# Patient Record
Sex: FEMALE | Race: OTHER | Employment: FULL TIME | URBAN - METROPOLITAN AREA
[De-identification: names, ages, dates, MRNs, and addresses within clinical notes are randomized per-mention and may not be internally consistent; named-entity substitution may affect disease eponyms.]

---

## 2023-12-08 ENCOUNTER — OFFICE VISIT (OUTPATIENT)
Dept: URGENT CARE | Facility: CLINIC | Age: 33
End: 2023-12-08

## 2023-12-08 VITALS
RESPIRATION RATE: 18 BRPM | SYSTOLIC BLOOD PRESSURE: 110 MMHG | WEIGHT: 162 LBS | OXYGEN SATURATION: 98 % | DIASTOLIC BLOOD PRESSURE: 78 MMHG | HEART RATE: 67 BPM | TEMPERATURE: 99 F

## 2023-12-08 DIAGNOSIS — U07.1 COVID-19: Primary | ICD-10-CM

## 2023-12-08 DIAGNOSIS — J06.9 ACUTE URI: ICD-10-CM

## 2023-12-08 LAB
SARS-COV-2 AG UPPER RESP QL IA: POSITIVE
VALID CONTROL: ABNORMAL

## 2023-12-08 NOTE — PROGRESS NOTES
[facility]        NAME: Adeel Kim is a 29 y.o. female  : 1990    MRN: [id number]  DATE: 2023  TIME: 1:15 PM    Assessment and Plan   Acute URI [J06.9]  1. Acute URI  Poct Covid 19 Rapid Antigen Test            Patient Instructions   COVID-19:   -The patient appears stable today. Rapid COVID Is positive. -Quarantine guidelines discussed as per CDC. Quarantine/isolate five days from the onset of symptoms and another five days with a N95/K95 mask. No fever for at least 24 hours without the use of fever reducing medications. Must have an improvement of your symptoms.   -Stay well hydrated and rested. Monitor hydration status. Push fluids. -Vitamin C, D and zinc daily.   -Neti-pot or nasal rinses for sinus pressure  -Run a humidifier next to your bed and take steam showers  -Tylenol for fever, chills, body aches. -Mucinex or OTC cold medications for symptomatic relief. OTC supportive measures discussed  -Warm salt water gargles and tea with honey for sore throat  -Obtain a pulse ox and monitor your oxygen 2-3 times a day. You want your O2 to be >93%. If your <93% you need to go to the ED immediately.  -Will follow up in 2-3 days. If you experience worsening/red flag symptoms like shortness of breath, worsening shortness of breath, confusion, lethargy, chest pains, dizziness, weakness go to the ED or follow up immediately for evaluation. Follow up with PCP in 3-5 days. Proceed to  ER if symptoms worsen. Chief Complaint     Chief Complaint   Patient presents with    Cold Like Symptoms     Pt here for concerns  of Covid  she needs testing for work. Pt here ill x 5 days  s/s  fever, headache, cough. Pt used Ibuprofen. Pt  did a home Cvoid test was positive. History of Present Illness       The patient is a 27-year-old female who presents today for COVID. The [email or web address] is used for translation today.  The patient tested positive yesterday with a home test but needs confirmation for work. She has been ill three days with fever, headache, coughing. She states that her worst sx today is the headache which is relieved with tylenol and motrin. No chills, body aches. No dyspnea, wheezing, chest tightness, cp, palpitations. No dizziness or weakness. No GI sx. Good PO intake. No loss of taste or smell. No lower extremity edema. No hx of asthma or smoking. No known sick contacts or recent travel. Review of Systems   Review of Systems   Constitutional:  Positive for fever. Negative for activity change, appetite change, chills, diaphoresis and fatigue. HENT:  Positive for congestion. Negative for ear discharge, ear pain, facial swelling, hearing loss, postnasal drip, rhinorrhea, sinus pressure, sinus pain, sore throat, tinnitus, trouble swallowing and voice change. Eyes:  Negative for visual disturbance. Respiratory:  Positive for cough. Negative for apnea, chest tightness, shortness of breath, wheezing and stridor. Cardiovascular:  Negative for chest pain, palpitations and leg swelling. Gastrointestinal:  Negative for abdominal distention and abdominal pain. Genitourinary:  Negative for decreased urine volume. Musculoskeletal:  Negative for arthralgias, joint swelling, myalgias, neck pain and neck stiffness. Skin:  Negative for rash. Allergic/Immunologic: Negative for immunocompromised state. Neurological:  Positive for headaches. Negative for dizziness, weakness, light-headedness and numbness. Hematological:  Negative for adenopathy. Current Medications     No current outpatient medications on file.     Current Allergies     Allergies as of 12/08/2023    (No Known Allergies)            The following portions of the patient's history were reviewed and updated as appropriate: allergies, current medications, past family history, past medical history, past social history, past surgical history and problem list.     Past Medical History: Diagnosis Date    Patient denies medical problems        Past Surgical History:   Procedure Laterality Date    CHOLECYSTECTOMY         History reviewed. No pertinent family history. Medications have been verified. Objective   /78   Pulse 67   Temp 99 °F (37.2 °C)   Resp 18   Wt 73.5 kg (162 lb)   SpO2 98%   No LMP recorded. Physical Exam     Physical Exam  Vitals and nursing note reviewed. Constitutional:       General: She is not in acute distress. Appearance: She is well-developed. She is not ill-appearing, toxic-appearing or diaphoretic. HENT:      Head: Normocephalic and atraumatic. Right Ear: Hearing, tympanic membrane, ear canal and external ear normal.      Left Ear: Hearing, tympanic membrane, ear canal and external ear normal.      Nose: Congestion present. No mucosal edema or rhinorrhea. Right Sinus: No maxillary sinus tenderness or frontal sinus tenderness. Left Sinus: No maxillary sinus tenderness or frontal sinus tenderness. Mouth/Throat:      Lips: Pink. Mouth: Mucous membranes are moist.      Pharynx: Uvula midline. No pharyngeal swelling, oropharyngeal exudate, posterior oropharyngeal erythema or uvula swelling. Tonsils: No tonsillar exudate or tonsillar abscesses. 1+ on the right. 1+ on the left. Cardiovascular:      Rate and Rhythm: Normal rate and regular rhythm. Heart sounds: S1 normal and S2 normal. Heart sounds not distant. No murmur heard. No friction rub. No gallop. No S3 or S4 sounds. Pulmonary:      Effort: No tachypnea, bradypnea, accessory muscle usage or respiratory distress. Breath sounds: Normal breath sounds and air entry. No stridor, decreased air movement or transmitted upper airway sounds. No decreased breath sounds, wheezing, rhonchi or rales. Musculoskeletal:      Cervical back: Normal range of motion and neck supple. Lymphadenopathy:      Cervical: No cervical adenopathy.    Neurological: Mental Status: She is alert and oriented to person, place, and time.    Psychiatric:         Behavior: Behavior normal.

## 2023-12-08 NOTE — PATIENT INSTRUCTIONS
COVID-19 (Enfermedad por coronavirus 2019)   LO QUE NECESITA SABER:   La COVID-19 es la enfermedad causada por el nuevo coronavirus descubierto por primera vez en diciembre de 2019. El virus puede diseminarse de 2 a 3 días antes del inicio de los síntomas. El virus ha [place] en varias formas nuevas (llamadas variantes) desde que se descubrió. Gary variante se puede diseminar más fácilmente o provocar gary enfermedad más grave que la original.  INSTRUCCIONES SOBRE EL MAIN HOSPITALARIA:   Llame al número de emergencias local (911 en los [place]) si:  Usted tiene dificultad para respirar o falta de aliento mientras descansa. Usted siente presión o dolor en el pecho que dura más de 5 minutos. Usted tiene confusión o es difícil despertarlo. Regrese a la miguel de emergencias si:  La piel de la hever, los dedos de las jessica o de los pies tiene un aspecto azulado o más oscuro de lo habitual.      Llame a zhu médico si:  Tiene fiebre. Usted tiene preguntas o inquietudes acerca de zhu condición o cuidado. Medicamentos: Es posible que usted necesite alguno de los siguientes:  Los descongestivos ayudan a reducir la congestión nasal y G[place]a Grieve a respirar más fácilmente. Si geoff pastillas descongestivas, pueden causarle agitación o problemas para dormir. No use aerosol descongestionante por más de unos cuantos días. Los jarabes para la tos ayudan a reducir la tos. Pregúntele a zhu médico cuál tipo de medicamento para la tos es mejor para usted. Acetaminofén [employer] dolor y baja la fiebre. Está disponible sin receta médica. Pregunte la cantidad y la frecuencia con que debe tomarlos. [address] [place] las etiquetas de todos los demás medicamentos que esté usando para saber si también contienen acetaminofén, o pregunte a zhu médico o farmacéutico. El acetaminofén puede causar daño en el hígado cuando no se geoff de forma correcta.     ARTEMIO ivan el ibuprofeno, ayudan a disminuir la inflamación, el dolor y la fiebre. Emmie medicamento está disponible con o sin gary receta médica. Los ARTEMIO pueden causar sangrado estomacal o problemas renales en ciertas personas. Si usted geoff un medicamento anticoagulante, siempre pregúntele a zhu médico si los ARTEMIO son seguros para usted. Siempre luis fernando la etiqueta de emmie medicamento y [address] instrucciones. Los anticoagulantes ayudan a evitar los coágulos sanguíneos. Los coágulos pueden ocasionar derrames cerebrales, ataques al corazón y [employer]. Hay muchos tipos de anticoagulantes disponibles. Zhu médico le dará instrucciones específicas según el tipo de anticoagulante que reciba. Johnita Earthly son pautas generales de seguridad para seguir mientras está tomando un anticoagulante:    Esté atento por si hay sangrado y moretones. Esté atento a cualquier sangrado de las encías o nariz. Esté atento a la aparición de chris en zhu orina y evacuaciones intestinales. Use gary toalla suave para zhu piel y un cepillo de dientes de cerdas suaves para cepillarse nima dientes. [place] puede evitar que zhu piel o encías sangren. Si usted se afeita, use gary rasuradora eléctrica. No practique deportes de contacto. Informe a zhu odontólogo y otros médicos que usted geoff anticoagulantes. Lleve un brazalete o un collar que indique que usted geoff [employer]. No empiece ni suspenda ningún otro medicamento o suplemento, tay cuando se lo indique zhu médico . Muchos medicamentos y suplementos no se pueden usar en combinación con los anticoagulantes. [place] el anticoagulante exactamente ivan se lo ordenó zhu médico. No omita ninguna dosis ni tome menos de lo indicado. Informe a zhu médico inmediatamente si usted olvida alex el anticoagulante o si geoff de más. [place] nima medicamentos ivan se le haya indicado. Consulte con zhu médico si usted michele que zhu medicamento no le está ayudando o si presenta efectos secundarios. Infórmele al médico si usted es alérgico a algún medicamento.  [employer] lista actualizada de [employer], las vitaminas y los productos herbales que geoff. Incluya los siguientes datos de los medicamentos: cantidad, frecuencia y motivo de administración. Traiga con usted la lista o los envases de las píldoras a nima citas de seguimiento. Lleve la lista de los medicamentos con usted en fam de gary emergencia. Lo que necesita saber acerca de las vacunas contra la COVID-19: Los médicos recomiendan la vacunación, incluso si ya tuvo COVID-19. Reciba la vacuna contra la COVID-19 según las indicaciones. Se recomienda la vacunación a todas las personas de 6 meses o [place]. Las vacunas contra la COVID-19 se administran en forma de inyección en 1 a 3 dosis primarias. [place] depende de la rl de la vacuna y de la edad de la persona que la recibe. Se recomienda gary dosis de refuerzo para [employer] de 5 años o más después de completar la serie primaria. Se recomienda gary segunda dosis refuerzo para Orlando Lex de 48 años o más y para los adolescentes inmunodeprimidos. La segunda dosis de refuerzo también se recomienda para todos los que recibieron la rl de la vacuna de 1 dosis ivan la primera dosis y un refuerzo. Zhu médico puede darle Hiren Bolivar refuerzos y ayudarlo a programar todas las dosis necesarias. Siga protegiéndose y protegiendo a los demás. Puede contagiarse incluso después de recibir la vacuna. También puede transmitir el virus a otras personas sin saber que tiene la infección. Después de vacunarse, compruebe las normas de viaje locales, nacionales e internacionales. Es posible que tenga que hacerse la prueba antes de viajar. Algunos países exigen evidencia de que la prueba es negativa antes de viajar. [place] vez también sea necesario hacer cuarentena tras zhu regreso. Se pueden administrar medicamentos para evitar gary infección. Los medicamentos pueden administrarse si tiene un alto riesgo de infección y no puede recibir la vacuna.  Scottie Cardenas pueden administrarse si zhu sistema inmunitario no responde aidan a la vacuna. Cómo se propaga el coronavirus 2019: El contacto personal cercano con gary persona infectada aumenta el riesgo de infección. [place] significa estar dentro de los 6 pies (2 metros) de distancia de la persona jagdish por lo menos 15 minutos en 24 horas. El virus viaja en las gotitas que se sal cuando gary persona habla, canta, tose o estornuda. Las [employer] pueden flotar en el aire por minutos u horas. La infección se produce cuando respira las gotitas o cuando le tocan los ojos o la [place]. El contacto de persona a persona puede propagar el virus. Por ejemplo, gary persona con el virus en nima jessica puede propagarlo al darle la mano a alguien. El virus puede permanecer en objetos y superficies jagdish horas o días. Puede infectarse si toca el objeto o la superficie y luego se toca los ojos o la boca. Modos de reducir el riesgo de contraer COVID-19 jagdish gary época de brote:  Candelaria Jetty en casa si está Aracelis Rakel que puede tener COVID-19. Es importante que se quede en casa si está esperando gary darci para gary prueba o los [place] de [place]. Lávese las jessica con frecuencia jagdish el día. Utilice agua y Rob. Frótese las jessica enjabonadas, [employer] dedos, jagdish al menos 20 segundos. Enjuáguese con agua corriente caliente. Séquese las jessica con gary toalla limpia o gary toalla de papel. Puede usar un desinfectante para jessica que contenga alcohol, si no hay agua y jabón disponibles. Enseñe a los niños a lavarse las jessica y a usar el desinfectante de [address] Cúbrase al toser y estornudar. Gire la hever y cúbrase la boca y la [place] con un pañuelo. Deseche el pañuelo. Use el ángulo del brazo si no tiene un pañuelo disponible. Luego lávese las jessica con agua y Rob o use un desinfectante de jessica. Enséñeles a los niños a cubrirse al toser o estornudar. Use un tapabocas (máscara) cuando sea necesario. Utilice un tapabocas de josie con al menos 2 capas. También puede crear capas colocando un tapabocas de joise sobre gary máscara no médica desechable. Cúbrase la boca y la [place]. Intente mantener el espacio entre usted y los demás cuando esté fuera de casa. Evite las multitudes lo más que pueda. Use un tapabocas cuando deba estar cerca de un phi tomer y no pueda mantener espacio entre usted y los demás. [place] y desinfecte a menudo los objetos y las superficies de alto contacto. Use toallitas húmedas desinfectantes o vanesa gary solución de 4 cucharaditas de lejía en 1 cuarto de galón (4 tazas) de agua. Pregunte sobre otras vacunas que usted puede necesitar. Debe recibir la vacuna contra la influenza (gripe) tan pronto ivan se lo recomienden cada año, generalmente en septiembre u octubre. Vacúnese contra la neumonía si se recomienda. Zhu médico puede indicarle si también debe recibir [employer], y cuándo aplicárselas. Acuda a la consulta de control con zhu médico según las indicaciones: Anote nima preguntas para que se acuerde de hacerlas jagdish nima visitas. Para más información:  Centers for Disease Control and Prevention  [address] [place] [address] [facility]  Phone: [phone]  Web Address: [email or web address]    © Copyright [facility] 2023 Information is for End User's use only and may not be sold, redistributed or otherwise used for commercial purposes. Esta información es sólo para uso en educación. Zhu intención no es darle un consejo médico sobre enfermedades o tratamientos. Colsulte con zhu Anne Duffel farmacéutico antes de seguir cualquier régimen médico para saber si es seguro y efectivo para usted. COVID-19:   -The patient appears stable today. Rapid COVID Is positive. -Quarantine guidelines discussed as per CDC. Quarantine/isolate five days from the onset of symptoms and another five days with a N95/K95 mask.  No fever for at least 24 hours without the use of fever reducing medications. Must have an improvement of your symptoms.   -Stay well hydrated and rested. Monitor hydration status. Push fluids. -Vitamin C, D and zinc daily.   -Neti-pot or nasal rinses for sinus pressure  -Run a humidifier next to your bed and take steam showers  -Tylenol for fever, chills, body aches. -Mucinex or OTC cold medications for symptomatic relief. OTC supportive measures discussed  -Warm salt water gargles and tea with honey for sore throat  -Obtain a pulse ox and monitor your oxygen 2-3 times a day. You want your O2 to be >93%. If your <93% you need to go to the ED immediately.  -Will follow up in 2-3 days. If you experience worsening/red flag symptoms like shortness of breath, worsening shortness of breath, confusion, lethargy, chest pains, dizziness, weakness go to the ED or follow up immediately for evaluation.

## 2023-12-08 NOTE — LETTER
December 8, 2023     Patient: Laura Denson   YOB: 1990   Date of Visit: 12/8/2023       To Whom It May Concern: It is my medical opinion that Keara Briceno tested positive for COVID today in our office and may return to work on 12/13/23. If you have any questions or concerns, please don't hesitate to call.          Sincerely,        Garima Melendez PA-C    CC: No Recipients